# Patient Record
Sex: MALE | Race: WHITE
[De-identification: names, ages, dates, MRNs, and addresses within clinical notes are randomized per-mention and may not be internally consistent; named-entity substitution may affect disease eponyms.]

---

## 2019-04-22 ENCOUNTER — HOSPITAL ENCOUNTER (EMERGENCY)
Dept: HOSPITAL 61 - ER | Age: 34
Discharge: HOME | End: 2019-04-22
Payer: COMMERCIAL

## 2019-04-22 VITALS — WEIGHT: 165 LBS | HEIGHT: 75 IN | BODY MASS INDEX: 20.51 KG/M2

## 2019-04-22 VITALS — SYSTOLIC BLOOD PRESSURE: 132 MMHG | DIASTOLIC BLOOD PRESSURE: 83 MMHG

## 2019-04-22 DIAGNOSIS — S76.911A: Primary | ICD-10-CM
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
## 2019-04-22 DIAGNOSIS — Z71.6: ICD-10-CM

## 2019-04-22 DIAGNOSIS — Z72.0: ICD-10-CM
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
## 2019-04-22 PROCEDURE — 99284 EMERGENCY DEPT VISIT MOD MDM: CPT

## 2019-04-22 PROCEDURE — 73552 X-RAY EXAM OF FEMUR 2/>: CPT

## 2019-04-22 PROCEDURE — 73502 X-RAY EXAM HIP UNI 2-3 VIEWS: CPT

## 2019-04-22 NOTE — PHYS DOC
Past Medical History


Past Medical History:  No Pertinent History


Past Surgical History:  No Surgical History


Alcohol Use:  Occasionally


Drug Use:  None





Adult General


Chief Complaint


Chief Complaint:  LOWER EXT PAIN





HPI


HPI





Patient is a 33  year old male who presents with complaining of left thigh pain.

Patient states he had left femoral fracture as a child and episodes of pain in 

his left thigh after heavy physical activity. Patient states he mowed the grass 

couple days ago and for the last 3 days has had  left thigh aching pain with 

episodes of sharp pain that is more than his usual pain after activity. Patient 

states he wants to make sure nothing is wrong with his left femur.





Review of Systems


Review of Systems





Constitutional: Denies fever or chills []


Eyes: Denies change in visual acuity, redness, or eye pain []


HENT: Denies nasal congestion or sore throat []


Respiratory: Denies cough or shortness of breath []


Cardiovascular: No additional information not addressed in HPI []


GI: Denies abdominal pain, nausea, vomiting, bloody stools or diarrhea []


: Denies dysuria or hematuria []


Musculoskeletal: Denies back pain or joint pain, reports extremity pain.[]


Integument: Denies rash or skin lesions []


Neurologic: Denies headache, focal weakness or sensory changes []


Endocrine: Denies polyuria or polydipsia []





All other systems were reviewed and found to be within normal limits, except as 

documented in this note.





Allergies


Allergies





Allergies








Coded Allergies Type Severity Reaction Last Updated Verified


 


  No Known Drug Allergies    19 No











Physical Exam


Physical Exam





Constitutional: Well developed, well nourished,mild distress, non-toxic 

appearance. []


HENT: Normocephalic, atraumatic.


Eyes: PERRLA, EOMI, conjunctiva normal, no discharge. [] 


Neck: Normal range of motion, no tenderness, supple, no stridor. [] 


Cardiovascular:Heart rate regular rhythm, no murmur []


Lungs & Thorax:  Bilateral breath sounds clear to auscultation []


Skin: Warm, dry, no erythema, no rash. [] 


Back: No tenderness, no CVA tenderness. [] 


Extremities: Right lower extremity without deformity or ecchymoses or erythema, 

mild tenderness in lateral sides of right thigh,, no cyanosis, no clubbing, ROM 

intact, no edema. [] 


Neurologic: Alert and oriented X 3, normal motor function, normal sensory 

function, no focal deficits noted. []


Psychologic: Affect normal, judgement normal, mood normal. []





Current Patient Data


Vital Signs





                                   Vital Signs








  Date Time  Temp Pulse Resp B/P (MAP) Pulse Ox O2 Delivery O2 Flow Rate FiO2


 


19 08:05 97.8 87 18 132/83 (99) 98 Room Air  





 97.8       











EKG


EKG


[]





Radiology/Procedures


Radiology/Procedures


[facility]


                    [address] [place] [address]


                                 [phone]


                                        


                                 IMAGING REPORT





                                     Signed





PATIENT: MARIBEL GREWAL   ACCOUNT: [id number]     MRN#: [id number]


: 1985           LOCATION: ER              AGE: 33


SEX: M                    EXAM DT: 19         ACCESSION#: [id number]


STATUS: REG ER            ORD. PHYSICIAN: JUAN NEIL MD   


REASON: pain


PROCEDURE: RIGHT FEMUR XRAY





Examination: RIGHT FEMUR XRAY, HIP RIGHT 2V WITH PELVIS


 


History: RIGHT LEG PAIN X 2 DAYS NO KNOWN INJURY<BR>PATIENT STATES HX OF 


PROXIMAL RIGHT LEG FRACTURE 20 YEARS AGO


 


Comparison/Correlation: None


 


Findings: Frontal view of the pelvis was obtained. Frontal view and 


frog-leg lateral view of the right hip were obtained. Frontal and lateral 


views of the right femur provided.


 


Sacroiliac and hip joint spaces are normal. Bony pelvis is unremarkable. 


Femur is unremarkable. No acute fracture or bony destruction. Soft tissues


are unremarkable. No degenerative change.


 


 


Impression:


No acute process.


 


Electronically signed by: Rick Pgae MD (2019 9:01 AM) [facility]














DICTATED and SIGNED BY:     RICK PAGE MD


DATE:     19 09





Course & Med Decision Making


Course & Med Decision Making


Pertinent  Imaging studies reviewed. (See chart for details)





Evaluation of patient in ER showed 33-year-old male patient with complaining of 

right thigh pain after physical activity with history of previous femoral 

fracture as chart. Patient had marked tenderness in lateral thigh without 

deformity or edema ecchymosis. X-ray was unremarkable. Patient did not want pain

 medication in ER.








I've spoken with the patient and/or caregivers. I've explained the patient's 

condition, diagnosis and treatment plan based on information available to me at 

this time. I've answered the patient's and/or caregivers questions and addressed

 any concerns. The patient and/or caregivers have a good understanding the 

patient's diagnosis, condition and treatment plan as can be expected at this 

point. Vital signs have been stabilized. The patient's condition is stable for 

discharge from the emergency department.





The patient will pursue further outpatient evaluation with her primary care 

provider or other designated consulting physician as outlined in the discharge 

instructions. Patient and/or caregivers are agreeable to this plan of care and 

follow-up instructions have been explained in detail. The patient and/or 

caregivers have received these instructions in written format and expressed 

understanding of these discharge instructions. The patient and her caregivers 

are aware that if any significant change in condition or worsening of symptoms 

should prompt him to immediately return to this of the closest emergency 

department.  If an emergent department is not readily available I would 

encourage him to call 911.





Emelina Disclaimer


Dragon Disclaimer


This electronic medical record was generated, in whole or in part, using a voice

 recognition dictation system.





Departure


Departure


Impression:  


   Primary Impression:  


   Muscle strain of right thigh


   Additional Impressions:  


   Tobacco abuse


   Tobacco abuse counseling


Disposition:   HOME, SELF-CARE (at 0 924)


Condition:  STABLE


Referrals:  


NO PCP (PCP)


Patient Instructions:  Muscle Strain, Smoking Cessation, Tips For Success





Additional Instructions:  


Drink plenty of liquids


Follow-up with your primary care physician in 3-5 days


Return to ER if not getting better


Apply ice on affected area


Scripts


Ibuprofen (IBUPROFEN) 800 Mg Tablet


800 MG PO PRN Q8HRS PRN for INFLAMMATION, #20 TAB


   Prov: JUAN NEIL MD         19 


Cyclobenzaprine Hcl (CYCLOBENZAPRINE HCL) 10 Mg Tablet


1 TAB PO TID for muscle pain, #30 TAB


   Prov: JUAN NEIL MD         19





Problem Qualifiers








   Primary Impression:  


   Muscle strain of right thigh


   Encounter type:  initial encounter  Qualified Codes:  S76.911A - Strain of 

   unspecified muscles, fascia and tendons at thigh level, right thigh, initial 

   encounter








JUAN NEIL MD             2019 09:29

## 2019-04-22 NOTE — RAD
Examination: RIGHT FEMUR XRAY, HIP RIGHT 2V WITH PELVIS

 

History: RIGHT LEG PAIN X 2 DAYS NO KNOWN INJURY<BR>PATIENT STATES HX OF 

PROXIMAL RIGHT LEG FRACTURE 20 YEARS AGO

 

Comparison/Correlation: None

 

Findings: Frontal view of the pelvis was obtained. Frontal view and 

frog-leg lateral view of the right hip were obtained. Frontal and lateral 

views of the right femur provided.

 

Sacroiliac and hip joint spaces are normal. Bony pelvis is unremarkable. 

Femur is unremarkable. No acute fracture or bony destruction. Soft tissues

are unremarkable. No degenerative change.

 

 

Impression:

No acute process.

 

Electronically signed by: Rick Burnett MD (4/22/2019 9:01 AM) [facility]

## 2019-04-22 NOTE — RAD
Examination: RIGHT FEMUR XRAY, HIP RIGHT 2V WITH PELVIS

 

History: RIGHT LEG PAIN X 2 DAYS NO KNOWN INJURY<BR>PATIENT STATES HX OF 

PROXIMAL RIGHT LEG FRACTURE 20 YEARS AGO

 

Comparison/Correlation: None

 

Findings: Frontal view of the pelvis was obtained. Frontal view and 

frog-leg lateral view of the right hip were obtained. Frontal and lateral 

views of the right femur provided.

 

Sacroiliac and hip joint spaces are normal. Bony pelvis is unremarkable. 

Femur is unremarkable. No acute fracture or bony destruction. Soft tissues

are unremarkable. No degenerative change.

 

 

Impression:

No acute process.

 

Electronically signed by: Rick Burnett MD (4/22/2019 9:01 AM) [facility]